# Patient Record
Sex: MALE | Race: WHITE | NOT HISPANIC OR LATINO | Employment: OTHER | URBAN - METROPOLITAN AREA
[De-identification: names, ages, dates, MRNs, and addresses within clinical notes are randomized per-mention and may not be internally consistent; named-entity substitution may affect disease eponyms.]

---

## 2017-08-15 ENCOUNTER — HOSPITAL ENCOUNTER (OUTPATIENT)
Facility: HOSPITAL | Age: 72
Setting detail: HOSPITAL OUTPATIENT SURGERY
End: 2017-08-15
Attending: SURGERY | Admitting: SURGERY

## 2017-09-13 ENCOUNTER — TRANSCRIBE ORDERS (OUTPATIENT)
Dept: ADMINISTRATIVE | Facility: HOSPITAL | Age: 72
End: 2017-09-13

## 2017-09-13 ENCOUNTER — HOSPITAL ENCOUNTER (OUTPATIENT)
Dept: GENERAL RADIOLOGY | Facility: HOSPITAL | Age: 72
Discharge: HOME | End: 2017-09-13
Admitting: SURGERY

## 2017-09-13 ENCOUNTER — LAB (OUTPATIENT)
Dept: LAB | Facility: HOSPITAL | Age: 72
End: 2017-09-13
Attending: INTERNAL MEDICINE

## 2017-09-13 ENCOUNTER — APPOINTMENT (OUTPATIENT)
Dept: PREADMISSION TESTING | Facility: HOSPITAL | Age: 72
End: 2017-09-13

## 2017-09-13 VITALS
OXYGEN SATURATION: 97 % | SYSTOLIC BLOOD PRESSURE: 158 MMHG | WEIGHT: 216 LBS | BODY MASS INDEX: 29.26 KG/M2 | RESPIRATION RATE: 16 BRPM | TEMPERATURE: 98.3 F | HEART RATE: 59 BPM | HEIGHT: 72 IN | DIASTOLIC BLOOD PRESSURE: 92 MMHG

## 2017-09-13 DIAGNOSIS — N17.9 ACUTE KIDNEY FAILURE, UNSPECIFIED (HCC): ICD-10-CM

## 2017-09-13 DIAGNOSIS — N17.9 ACUTE KIDNEY INJURY (NONTRAUMATIC) (HCC): Primary | ICD-10-CM

## 2017-09-13 DIAGNOSIS — N17.9 ACUTE KIDNEY FAILURE, UNSPECIFIED (HCC): Primary | ICD-10-CM

## 2017-09-13 LAB
ALBUMIN SERPL-MCNC: 3.7 G/DL (ref 3.5–5.2)
ALBUMIN/GLOB SERPL: 1.2 G/DL
ALP SERPL-CCNC: 81 U/L (ref 39–117)
ALT SERPL W P-5'-P-CCNC: 10 U/L (ref 1–41)
ANION GAP SERPL CALCULATED.3IONS-SCNC: 12.7 MMOL/L
APTT PPP: 31.1 SECONDS (ref 22.7–35.4)
AST SERPL-CCNC: 12 U/L (ref 1–40)
BACTERIA UR QL AUTO: ABNORMAL /HPF
BASOPHILS # BLD AUTO: 0.03 10*3/MM3 (ref 0–0.2)
BASOPHILS NFR BLD AUTO: 0.3 % (ref 0–1.5)
BILIRUB SERPL-MCNC: 0.3 MG/DL (ref 0.1–1.2)
BILIRUB UR QL STRIP: NEGATIVE
BUN BLD-MCNC: 22 MG/DL (ref 8–23)
BUN/CREAT SERPL: 8.9 (ref 7–25)
CALCIUM SPEC-SCNC: 9.5 MG/DL (ref 8.6–10.5)
CHLORIDE SERPL-SCNC: 104 MMOL/L (ref 98–107)
CLARITY UR: CLEAR
CO2 SERPL-SCNC: 23.3 MMOL/L (ref 22–29)
COARSE GRAN CASTS URNS QL MICRO: ABNORMAL /LPF
COLOR UR: YELLOW
CREAT BLD-MCNC: 2.48 MG/DL (ref 0.76–1.27)
DEPRECATED RDW RBC AUTO: 42.7 FL (ref 37–54)
EOSINOPHIL # BLD AUTO: 0.26 10*3/MM3 (ref 0–0.7)
EOSINOPHIL NFR BLD AUTO: 2.8 % (ref 0.3–6.2)
ERYTHROCYTE [DISTWIDTH] IN BLOOD BY AUTOMATED COUNT: 13.3 % (ref 11.5–14.5)
GFR SERPL CREATININE-BSD FRML MDRD: 26 ML/MIN/1.73
GLOBULIN UR ELPH-MCNC: 3 GM/DL
GLUCOSE BLD-MCNC: 70 MG/DL (ref 65–99)
GLUCOSE UR STRIP-MCNC: NEGATIVE MG/DL
HCT VFR BLD AUTO: 35.6 % (ref 40.4–52.2)
HGB BLD-MCNC: 12.1 G/DL (ref 13.7–17.6)
HGB UR QL STRIP.AUTO: ABNORMAL
HYALINE CASTS UR QL AUTO: ABNORMAL /LPF
IMM GRANULOCYTES # BLD: 0.02 10*3/MM3 (ref 0–0.03)
IMM GRANULOCYTES NFR BLD: 0.2 % (ref 0–0.5)
INR PPP: 0.95 (ref 0.9–1.1)
KETONES UR QL STRIP: NEGATIVE
LEUKOCYTE ESTERASE UR QL STRIP.AUTO: NEGATIVE
LYMPHOCYTES # BLD AUTO: 1.6 10*3/MM3 (ref 0.9–4.8)
LYMPHOCYTES NFR BLD AUTO: 16.9 % (ref 19.6–45.3)
MCH RBC QN AUTO: 29.4 PG (ref 27–32.7)
MCHC RBC AUTO-ENTMCNC: 34 G/DL (ref 32.6–36.4)
MCV RBC AUTO: 86.6 FL (ref 79.8–96.2)
MONOCYTES # BLD AUTO: 0.82 10*3/MM3 (ref 0.2–1.2)
MONOCYTES NFR BLD AUTO: 8.7 % (ref 5–12)
MUCOUS THREADS URNS QL MICRO: ABNORMAL /HPF
NEUTROPHILS # BLD AUTO: 6.72 10*3/MM3 (ref 1.9–8.1)
NEUTROPHILS NFR BLD AUTO: 71.1 % (ref 42.7–76)
NITRITE UR QL STRIP: NEGATIVE
PH UR STRIP.AUTO: 6 [PH] (ref 5–8)
PHOSPHATE SERPL-MCNC: 3.6 MG/DL (ref 2.5–4.5)
PLATELET # BLD AUTO: 407 10*3/MM3 (ref 140–500)
PMV BLD AUTO: 9.1 FL (ref 6–12)
POTASSIUM BLD-SCNC: 3.1 MMOL/L (ref 3.5–5.2)
PROT SERPL-MCNC: 6.7 G/DL (ref 6–8.5)
PROT UR QL STRIP: ABNORMAL
PROTHROMBIN TIME: 12.3 SECONDS (ref 11.7–14.2)
PTH-INTACT SERPL-MCNC: 66.1 PG/ML (ref 15–65)
RBC # BLD AUTO: 4.11 10*6/MM3 (ref 4.6–6)
RBC # UR: ABNORMAL /HPF
REF LAB TEST METHOD: ABNORMAL
SODIUM BLD-SCNC: 140 MMOL/L (ref 136–145)
SP GR UR STRIP: >=1.03 (ref 1–1.03)
SQUAMOUS #/AREA URNS HPF: ABNORMAL /HPF
URATE SERPL-MCNC: 7.4 MG/DL (ref 3.4–7)
UROBILINOGEN UR QL STRIP: ABNORMAL
WBC NRBC COR # BLD: 9.45 10*3/MM3 (ref 4.5–10.7)
WBC UR QL AUTO: ABNORMAL /HPF

## 2017-09-13 PROCEDURE — 81001 URINALYSIS AUTO W/SCOPE: CPT | Performed by: SURGERY

## 2017-09-13 PROCEDURE — 71020 HC CHEST PA AND LATERAL: CPT

## 2017-09-13 PROCEDURE — 85025 COMPLETE CBC W/AUTO DIFF WBC: CPT

## 2017-09-13 PROCEDURE — 93010 ELECTROCARDIOGRAM REPORT: CPT | Performed by: INTERNAL MEDICINE

## 2017-09-13 PROCEDURE — 84550 ASSAY OF BLOOD/URIC ACID: CPT

## 2017-09-13 PROCEDURE — 85610 PROTHROMBIN TIME: CPT | Performed by: SURGERY

## 2017-09-13 PROCEDURE — 36415 COLL VENOUS BLD VENIPUNCTURE: CPT

## 2017-09-13 PROCEDURE — 80053 COMPREHEN METABOLIC PANEL: CPT | Performed by: SURGERY

## 2017-09-13 PROCEDURE — 85730 THROMBOPLASTIN TIME PARTIAL: CPT | Performed by: SURGERY

## 2017-09-13 PROCEDURE — 83970 ASSAY OF PARATHORMONE: CPT

## 2017-09-13 PROCEDURE — 93005 ELECTROCARDIOGRAM TRACING: CPT

## 2017-09-13 PROCEDURE — 84100 ASSAY OF PHOSPHORUS: CPT

## 2017-09-13 RX ORDER — HYDROCODONE BITARTRATE AND ACETAMINOPHEN 5; 325 MG/1; MG/1
1 TABLET ORAL EVERY 6 HOURS PRN
Status: ON HOLD | COMMUNITY
End: 2017-09-19 | Stop reason: SDUPTHER

## 2017-09-13 RX ORDER — AMLODIPINE BESYLATE 5 MG/1
5 TABLET ORAL EVERY MORNING
COMMUNITY

## 2017-09-13 RX ORDER — ERGOCALCIFEROL 1.25 MG/1
50000 CAPSULE ORAL 2 TIMES WEEKLY
COMMUNITY

## 2017-09-13 RX ORDER — FLUOXETINE HYDROCHLORIDE 40 MG/1
40 CAPSULE ORAL EVERY MORNING
COMMUNITY

## 2017-09-13 RX ORDER — ASPIRIN 81 MG/1
81 TABLET ORAL DAILY
COMMUNITY

## 2017-09-13 RX ORDER — METOPROLOL TARTRATE 50 MG/1
50 TABLET, FILM COATED ORAL 2 TIMES DAILY
COMMUNITY

## 2017-09-13 RX ORDER — CHLORAL HYDRATE 500 MG
1000 CAPSULE ORAL 2 TIMES DAILY WITH MEALS
COMMUNITY

## 2017-09-13 RX ORDER — OMEPRAZOLE 40 MG/1
40 CAPSULE, DELAYED RELEASE ORAL EVERY MORNING
COMMUNITY

## 2017-09-13 NOTE — DISCHARGE INSTRUCTIONS
Take the following medications the morning of surgery with a small sip of water:    METOPROLOL, FLUOXETINE, OMEPRAZOLE AND AMLODIPINE.    ARRIVE AT 8:30    General Instructions:  • Do not eat solid food after midnight the night before surgery.  • You may drink clear liquids day of surgery but must stop at least one hour before your hospital arrival time.  • It is beneficial for you to have a clear drink that contains carbohydrates the day of surgery.  We suggest a 20 ounce bottle of Gatorade or Powerade for non-diabetic patients or a 20 ounce bottle of G2 or Powerade Zero for diabetic patients. (Pediatric patients, are not advised to drink a 20 ounce carbohydrate drink)    Clear liquids are liquids you can see through.  Nothing red in color.     Plain water                               Sports drinks  Sodas                                   Gelatin (Jell-O)  Fruit juices without pulp such as white grape juice and apple juice  Popsicles that contain no fruit or yogurt  Tea or coffee (no cream or milk added)  Gatorade / Powerade  G2 / Powerade Zero    • Infants may have breast milk up to four hours before surgery.  • Infants drinking formula may drink formula up to six hours before surgery.   • Patients who avoid smoking, chewing tobacco and alcohol for 4 weeks prior to surgery have a reduced risk of post-operative complications.  Quit smoking as many days before surgery as you can.  • Do not smoke, use chewing tobacco or drink alcohol the day of surgery.   • If applicable bring your C-PAP/ BI-PAP machine.  • Bring any papers given to you in the doctor’s office.  • Wear clean comfortable clothes and socks.  • Do not wear contact lenses or make-up.  Bring a case for your glasses.   • Bring crutches or walker if applicable.  • Leave all other valuables and jewelry at home.  • The Pre-Admission Testing nurse will instruct you to bring medications if unable to obtain an accurate list in Pre-Admission Testing.        If  you were given a blood bank ID arm band remember to bring it with you the day of surgery.    Preventing a Surgical Site Infection:  • For 2 to 3 days before surgery, avoid shaving with a razor because the razor can irritate skin and make it easier to develop an infection.  • The night prior to surgery sleep in a clean bed with clean clothing.  Do not allow pets to sleep with you.  • Shower on the morning of surgery using a fresh bar of anti-bacterial soap (such as Dial) and clean washcloth.  Dry with a clean towel and dress in clean clothing.  • Ask your surgeon if you will be receiving antibiotics prior to surgery.  • Make sure you, your family, and all healthcare providers clean their hands with soap and water or an alcohol based hand  before caring for you or your wound.    Day of surgery:  Upon arrival, a Pre-op nurse and Anesthesiologist will review your health history, obtain vital signs, and answer questions you may have.  The only belongings needed at this time will be your home medications and if applicable your C-PAP/BI-PAP machine.  If you are staying overnight your family can leave the rest of your belongings in the car and bring them to your room later.  A Pre-op nurse will start an IV and you may receive medication in preparation for surgery, including something to help you relax.  Your family will be able to see you in the Pre-op area.  While you are in surgery your family should notify the waiting room  if they leave the waiting room area and provide a contact phone number.    Please be aware that surgery does come with discomfort.  We want to make every effort to control your discomfort so please discuss any uncontrolled symptoms with your nurse.   Your doctor will most likely have prescribed pain medications.      If you are going home after surgery you will receive individualized written care instructions before being discharged.  A responsible adult must drive you to and from  the hospital on the day of your surgery and stay with you for 24 hours.    If you are staying overnight following surgery, you will be transported to your hospital room following the recovery period.  [facility] has all private rooms.    If you have any questions please call Pre-Admission Testing at [phone].  Deductibles and co-payments are collected on the day of service. Please be prepared to pay the required co-pay, deductible or deposit on the day of service as defined by your plan.

## 2017-09-19 ENCOUNTER — HOSPITAL ENCOUNTER (OUTPATIENT)
Facility: HOSPITAL | Age: 72
Setting detail: OBSERVATION
LOS: 1 days | Discharge: HOME | End: 2017-09-20
Attending: INTERNAL MEDICINE | Admitting: INTERNAL MEDICINE

## 2017-09-19 ENCOUNTER — APPOINTMENT (OUTPATIENT)
Dept: CT IMAGING | Facility: HOSPITAL | Age: 72
End: 2017-09-19

## 2017-09-19 DIAGNOSIS — R53.1 WEAKNESS: Primary | ICD-10-CM

## 2017-09-19 PROBLEM — I10 ESSENTIAL HYPERTENSION: Status: ACTIVE | Noted: 2017-09-19

## 2017-09-19 PROBLEM — E11.8 TYPE 2 DIABETES MELLITUS WITH COMPLICATION (HCC): Status: ACTIVE | Noted: 2017-09-19

## 2017-09-19 PROBLEM — N17.9 ACUTE RENAL FAILURE (HCC): Status: ACTIVE | Noted: 2017-09-19

## 2017-09-19 PROBLEM — I48.91 ATRIAL FIBRILLATION (HCC): Status: ACTIVE | Noted: 2017-09-19

## 2017-09-19 PROBLEM — N18.4 CKD (CHRONIC KIDNEY DISEASE) STAGE 4, GFR 15-29 ML/MIN (HCC): Status: ACTIVE | Noted: 2017-09-19

## 2017-09-19 PROBLEM — R26.9 NEUROLOGIC GAIT DISORDER: Status: ACTIVE | Noted: 2017-09-19

## 2017-09-19 PROBLEM — R11.2 NAUSEA & VOMITING: Status: ACTIVE | Noted: 2017-09-19

## 2017-09-19 PROBLEM — N17.9 ACUTE RENAL FAILURE (HCC): Status: RESOLVED | Noted: 2017-09-19 | Resolved: 2017-09-19

## 2017-09-19 LAB
ALBUMIN SERPL-MCNC: 3.6 G/DL (ref 3.5–5.2)
ALBUMIN/GLOB SERPL: 1.1 G/DL
ALP SERPL-CCNC: 86 U/L (ref 39–117)
ALT SERPL W P-5'-P-CCNC: 8 U/L (ref 1–41)
ANION GAP SERPL CALCULATED.3IONS-SCNC: 16.6 MMOL/L
AST SERPL-CCNC: 11 U/L (ref 1–40)
BASOPHILS # BLD AUTO: 0.02 10*3/MM3 (ref 0–0.2)
BASOPHILS NFR BLD AUTO: 0.2 % (ref 0–1.5)
BILIRUB SERPL-MCNC: 0.4 MG/DL (ref 0.1–1.2)
BUN BLD-MCNC: 21 MG/DL (ref 8–23)
BUN/CREAT SERPL: 7.9 (ref 7–25)
CALCIUM SPEC-SCNC: 9.9 MG/DL (ref 8.6–10.5)
CHLORIDE SERPL-SCNC: 102 MMOL/L (ref 98–107)
CO2 SERPL-SCNC: 22.4 MMOL/L (ref 22–29)
CREAT BLD-MCNC: 2.65 MG/DL (ref 0.76–1.27)
DEPRECATED RDW RBC AUTO: 42.6 FL (ref 37–54)
EOSINOPHIL # BLD AUTO: 0.13 10*3/MM3 (ref 0–0.7)
EOSINOPHIL NFR BLD AUTO: 1.4 % (ref 0.3–6.2)
ERYTHROCYTE [DISTWIDTH] IN BLOOD BY AUTOMATED COUNT: 13.3 % (ref 11.5–14.5)
GFR SERPL CREATININE-BSD FRML MDRD: 24 ML/MIN/1.73
GLOBULIN UR ELPH-MCNC: 3.3 GM/DL
GLUCOSE BLD-MCNC: 118 MG/DL (ref 65–99)
GLUCOSE BLDC GLUCOMTR-MCNC: 111 MG/DL (ref 70–130)
GLUCOSE BLDC GLUCOMTR-MCNC: 133 MG/DL (ref 70–130)
HBA1C MFR BLD: 6.29 % (ref 4.8–5.6)
HCT VFR BLD AUTO: 35.1 % (ref 40.4–52.2)
HGB BLD-MCNC: 11.8 G/DL (ref 13.7–17.6)
IMM GRANULOCYTES # BLD: 0.03 10*3/MM3 (ref 0–0.03)
IMM GRANULOCYTES NFR BLD: 0.3 % (ref 0–0.5)
INR PPP: 1.01 (ref 0.9–1.1)
LYMPHOCYTES # BLD AUTO: 1.1 10*3/MM3 (ref 0.9–4.8)
LYMPHOCYTES NFR BLD AUTO: 12.2 % (ref 19.6–45.3)
MAGNESIUM SERPL-MCNC: 1.8 MG/DL (ref 1.6–2.4)
MCH RBC QN AUTO: 29.4 PG (ref 27–32.7)
MCHC RBC AUTO-ENTMCNC: 33.6 G/DL (ref 32.6–36.4)
MCV RBC AUTO: 87.3 FL (ref 79.8–96.2)
MONOCYTES # BLD AUTO: 0.67 10*3/MM3 (ref 0.2–1.2)
MONOCYTES NFR BLD AUTO: 7.4 % (ref 5–12)
NEUTROPHILS # BLD AUTO: 7.1 10*3/MM3 (ref 1.9–8.1)
NEUTROPHILS NFR BLD AUTO: 78.5 % (ref 42.7–76)
PHOSPHATE SERPL-MCNC: 3.8 MG/DL (ref 2.5–4.5)
PLATELET # BLD AUTO: 370 10*3/MM3 (ref 140–500)
PMV BLD AUTO: 9.2 FL (ref 6–12)
POTASSIUM BLD-SCNC: 3.8 MMOL/L (ref 3.5–5.2)
PROT SERPL-MCNC: 6.9 G/DL (ref 6–8.5)
PROTHROMBIN TIME: 12.9 SECONDS (ref 11.7–14.2)
RBC # BLD AUTO: 4.02 10*6/MM3 (ref 4.6–6)
SODIUM BLD-SCNC: 141 MMOL/L (ref 136–145)
TSH SERPL DL<=0.05 MIU/L-ACNC: 0.86 MIU/ML (ref 0.27–4.2)
WBC NRBC COR # BLD: 9.05 10*3/MM3 (ref 4.5–10.7)

## 2017-09-19 PROCEDURE — 82962 GLUCOSE BLOOD TEST: CPT

## 2017-09-19 PROCEDURE — G0378 HOSPITAL OBSERVATION PER HR: HCPCS

## 2017-09-19 PROCEDURE — 93005 ELECTROCARDIOGRAM TRACING: CPT | Performed by: INTERNAL MEDICINE

## 2017-09-19 PROCEDURE — 74176 CT ABD & PELVIS W/O CONTRAST: CPT

## 2017-09-19 PROCEDURE — 85610 PROTHROMBIN TIME: CPT | Performed by: INTERNAL MEDICINE

## 2017-09-19 PROCEDURE — 96361 HYDRATE IV INFUSION ADD-ON: CPT

## 2017-09-19 PROCEDURE — 85025 COMPLETE CBC W/AUTO DIFF WBC: CPT | Performed by: INTERNAL MEDICINE

## 2017-09-19 PROCEDURE — 93010 ELECTROCARDIOGRAM REPORT: CPT | Performed by: INTERNAL MEDICINE

## 2017-09-19 PROCEDURE — 80053 COMPREHEN METABOLIC PANEL: CPT | Performed by: INTERNAL MEDICINE

## 2017-09-19 PROCEDURE — 84100 ASSAY OF PHOSPHORUS: CPT | Performed by: INTERNAL MEDICINE

## 2017-09-19 PROCEDURE — 83735 ASSAY OF MAGNESIUM: CPT | Performed by: INTERNAL MEDICINE

## 2017-09-19 PROCEDURE — 83036 HEMOGLOBIN GLYCOSYLATED A1C: CPT | Performed by: INTERNAL MEDICINE

## 2017-09-19 PROCEDURE — 84443 ASSAY THYROID STIM HORMONE: CPT | Performed by: INTERNAL MEDICINE

## 2017-09-19 RX ORDER — ACETAMINOPHEN 325 MG/1
650 TABLET ORAL EVERY 4 HOURS PRN
Status: DISCONTINUED | OUTPATIENT
Start: 2017-09-19 | End: 2017-09-20 | Stop reason: HOSPADM

## 2017-09-19 RX ORDER — NICOTINE POLACRILEX 4 MG
15 LOZENGE BUCCAL
Status: DISCONTINUED | OUTPATIENT
Start: 2017-09-19 | End: 2017-09-20 | Stop reason: HOSPADM

## 2017-09-19 RX ORDER — HYDROCODONE BITARTRATE AND ACETAMINOPHEN 5; 325 MG/1; MG/1
1 TABLET ORAL EVERY 4 HOURS PRN
Status: DISCONTINUED | OUTPATIENT
Start: 2017-09-19 | End: 2017-09-20 | Stop reason: HOSPADM

## 2017-09-19 RX ORDER — SODIUM CHLORIDE 9 MG/ML
75 INJECTION, SOLUTION INTRAVENOUS CONTINUOUS
Status: DISCONTINUED | OUTPATIENT
Start: 2017-09-19 | End: 2017-09-20

## 2017-09-19 RX ORDER — ONDANSETRON 4 MG/1
4 TABLET, ORALLY DISINTEGRATING ORAL EVERY 6 HOURS PRN
Status: DISCONTINUED | OUTPATIENT
Start: 2017-09-19 | End: 2017-09-20 | Stop reason: HOSPADM

## 2017-09-19 RX ORDER — SODIUM CHLORIDE 0.9 % (FLUSH) 0.9 %
1-10 SYRINGE (ML) INJECTION AS NEEDED
Status: DISCONTINUED | OUTPATIENT
Start: 2017-09-19 | End: 2017-09-20 | Stop reason: HOSPADM

## 2017-09-19 RX ORDER — DEXTROSE MONOHYDRATE 25 G/50ML
25 INJECTION, SOLUTION INTRAVENOUS
Status: DISCONTINUED | OUTPATIENT
Start: 2017-09-19 | End: 2017-09-20 | Stop reason: HOSPADM

## 2017-09-19 RX ORDER — METOPROLOL TARTRATE 50 MG/1
50 TABLET, FILM COATED ORAL 2 TIMES DAILY
Status: DISCONTINUED | OUTPATIENT
Start: 2017-09-19 | End: 2017-09-20 | Stop reason: HOSPADM

## 2017-09-19 RX ORDER — INSULIN GLARGINE 100 [IU]/ML
50 INJECTION, SOLUTION SUBCUTANEOUS NIGHTLY
COMMUNITY

## 2017-09-19 RX ORDER — NALOXONE HCL 0.4 MG/ML
0.4 VIAL (ML) INJECTION
Status: DISCONTINUED | OUTPATIENT
Start: 2017-09-19 | End: 2017-09-20 | Stop reason: HOSPADM

## 2017-09-19 RX ORDER — HYDROMORPHONE HYDROCHLORIDE 1 MG/ML
0.5 INJECTION, SOLUTION INTRAMUSCULAR; INTRAVENOUS; SUBCUTANEOUS
Status: DISCONTINUED | OUTPATIENT
Start: 2017-09-19 | End: 2017-09-20 | Stop reason: HOSPADM

## 2017-09-19 RX ORDER — ONDANSETRON 2 MG/ML
4 INJECTION INTRAMUSCULAR; INTRAVENOUS EVERY 6 HOURS PRN
Status: DISCONTINUED | OUTPATIENT
Start: 2017-09-19 | End: 2017-09-20 | Stop reason: HOSPADM

## 2017-09-19 RX ORDER — HYDROCODONE BITARTRATE AND ACETAMINOPHEN 10; 325 MG/1; MG/1
1 TABLET ORAL 4 TIMES DAILY
COMMUNITY

## 2017-09-19 RX ORDER — FLUOXETINE HYDROCHLORIDE 20 MG/1
40 CAPSULE ORAL EVERY MORNING
Status: DISCONTINUED | OUTPATIENT
Start: 2017-09-20 | End: 2017-09-20 | Stop reason: HOSPADM

## 2017-09-19 RX ORDER — ONDANSETRON 4 MG/1
4 TABLET, FILM COATED ORAL EVERY 6 HOURS PRN
Status: DISCONTINUED | OUTPATIENT
Start: 2017-09-19 | End: 2017-09-20 | Stop reason: HOSPADM

## 2017-09-19 RX ORDER — PANTOPRAZOLE SODIUM 40 MG/1
40 TABLET, DELAYED RELEASE ORAL EVERY MORNING
Status: DISCONTINUED | OUTPATIENT
Start: 2017-09-20 | End: 2017-09-20 | Stop reason: HOSPADM

## 2017-09-19 RX ORDER — AMLODIPINE BESYLATE 5 MG/1
5 TABLET ORAL EVERY MORNING
Status: DISCONTINUED | OUTPATIENT
Start: 2017-09-20 | End: 2017-09-19

## 2017-09-19 RX ORDER — CEFAZOLIN SODIUM 2 G/100ML
2 INJECTION, SOLUTION INTRAVENOUS ONCE
Status: CANCELLED | OUTPATIENT
Start: 2017-09-20 | End: 2017-09-19

## 2017-09-19 RX ORDER — NITROGLYCERIN 0.4 MG/1
0.4 TABLET SUBLINGUAL
Status: DISCONTINUED | OUTPATIENT
Start: 2017-09-19 | End: 2017-09-20 | Stop reason: HOSPADM

## 2017-09-19 RX ORDER — AMLODIPINE BESYLATE 5 MG/1
5 TABLET ORAL EVERY MORNING
Status: DISCONTINUED | OUTPATIENT
Start: 2017-09-19 | End: 2017-09-20 | Stop reason: HOSPADM

## 2017-09-19 RX ORDER — CEFAZOLIN SODIUM 2 G/100ML
2 INJECTION, SOLUTION INTRAVENOUS ONCE
Status: CANCELLED | OUTPATIENT
Start: 2017-09-20 | End: 2017-09-20

## 2017-09-19 RX ORDER — SENNA AND DOCUSATE SODIUM 50; 8.6 MG/1; MG/1
2 TABLET, FILM COATED ORAL NIGHTLY PRN
Status: DISCONTINUED | OUTPATIENT
Start: 2017-09-19 | End: 2017-09-20 | Stop reason: HOSPADM

## 2017-09-19 RX ADMIN — AMLODIPINE BESYLATE 5 MG: 5 TABLET ORAL at 23:23

## 2017-09-19 RX ADMIN — METOPROLOL TARTRATE 50 MG: 50 TABLET, FILM COATED ORAL at 18:25

## 2017-09-19 RX ADMIN — HYDROCODONE BITARTRATE AND ACETAMINOPHEN 1 TABLET: 5; 325 TABLET ORAL at 20:12

## 2017-09-19 RX ADMIN — SODIUM CHLORIDE 75 ML/HR: 9 INJECTION, SOLUTION INTRAVENOUS at 18:25

## 2017-09-19 NOTE — PROGRESS NOTES
Clinical Pharmacy Services: Medication History    Robert Botello is a 71 y.o. male presenting to [facility] with chief complaint of:   Acute renal failure     He  has a past medical history of Acid reflux; Anemia; Atrial fibrillation; Coronary artery disease; Diabetes mellitus; Exposure to TB; Heart attack; History of transfusion; Hypertension; Kidney disease; Leg weakness; and Neuropathy.    Allergies as of 09/19/2017 - Ke as Reviewed 09/13/2017   Allergen Reaction Noted   • Atorvastatin  09/13/2017       Medication information was obtained from: Patient and patient's pharmacy  Pharmacy and Phone Number: [facility], [phone]    Prior to Admission Medications     Prescriptions Last Dose Informant Patient Reported? Taking?    amLODIPine (NORVASC) 5 MG tablet  Pharmacy Yes Yes    Take 5 mg by mouth Every Morning.    FLUoxetine (PROzac) 40 MG capsule  Pharmacy Yes Yes    Take 40 mg by mouth Every Morning.    HYDROcodone-acetaminophen (NORCO)  MG per tablet  Pharmacy Yes Yes    Take 1 tablet by mouth 4 (Four) Times a Day.    insulin glargine (LANTUS) 100 UNIT/ML injection  Pharmacy Yes Yes    Inject 50 Units under the skin Every Night.    metoprolol tartrate (LOPRESSOR) 50 MG tablet  Pharmacy Yes Yes    Take 50 mg by mouth 2 (Two) Times a Day.    Omega-3 Fatty Acids (FISH OIL) 1000 MG capsule capsule  Pharmacy Yes Yes    Take 1,000 mg by mouth 2 (Two) Times a Day With Meals.    omeprazole (priLOSEC) 40 MG capsule  Pharmacy Yes Yes    Take 40 mg by mouth Every Morning.    aspirin 81 MG EC tablet  Self Yes No    Take 81 mg by mouth Daily. PT HOLDING FOR SURGERY    vitamin D (ERGOCALCIFEROL) 22311 units capsule capsule  Pharmacy Yes No    Take 50,000 Units by mouth 2 (Two) Times a Week.            Medication notes: The following changes were made to the patient's home med list, per pharmacy:  -Norco 10/325 mg QID  -Lantus 50 units SQ QHS    Pt's pharmacy noted pt has Rx for Vitamin D 50,000 units 2 times a week  but has not filled since 12/16    This medication list is complete to the best of my knowledge as of 9/19/2017    Please call if questions.    Alfreda Navarro, Pharmacy Intern  9/19/2017 3:32 PM

## 2017-09-19 NOTE — H&P
Name: Robert Botello ADMIT: 2017   : 1945  PCP: No Known Provider    MRN: [id number] LOS: 1 days   AGE/SEX: 71 y.o. male  ROOM: 520/1     No chief complaint on file.  nausea/vomiting    Subjective   Mr. Botello is a 71 y.o. male who presents to [facility] from nephrology clinic for nausea and vomiting and left leg weakness. He was admitted for IV hydration and control of symptoms before planned AV fistula placement tomorrow. He reports several months of nausea and dry heaves which have been slowly worsening. He denies abdominal pain. Last BM was today and was normal. No CP or SOA. He also reports about 50lbs of unanticipated weight loss. He denies night sweats or lymphadenopathy. He has also had several months of left leg 'giving out'. This has been worked up by multiple MD's at [place] including Neurology and he recently had lumbar MRI showing chronic changes but no spinal cord impingement. He denies any leg pain to me currently and his leg will just give out randomly without preceding symptoms. He denies any palpitations or LOC associated. He has not had foot drop or peripheral numbness.    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   • Heart attack        • History of transfusion    • Hypertension    • Kidney disease    • Leg weakness     USES WALKER   • Neuropathy      Past Surgical History:   Procedure Laterality Date   • ANKLE SURGERY     • AORTIC VALVE REPAIR/REPLACEMENT     • CORONARY ANGIOPLASTY     • CORONARY STENT PLACEMENT     • HEMORROIDECTOMY     • TIBIA FRACTURE SURGERY       Family History   Problem Relation Age of Onset   • Malig Hyperthermia Neg Hx      Social History   Substance Use Topics   • Smoking status: Former Smoker     Packs/day: 0.50     Years: 34.00     Types: Cigarettes   • Smokeless  tobacco: Never Used   • Alcohol use No     Prescriptions Prior to Admission   Medication Sig Dispense Refill Last Dose   • amLODIPine (NORVASC) 5 MG tablet Take 5 mg by mouth Every Morning.    at Unknown time   • FLUoxetine (PROzac) 40 MG capsule Take 40 mg by mouth Every Morning.      • HYDROcodone-acetaminophen (NORCO)  MG per tablet Take 1 tablet by mouth 4 (Four) Times a Day.   9/19/2017 at 1000   • insulin glargine (LANTUS) 100 UNIT/ML injection Inject 50 Units under the skin Every Night.      • metoprolol tartrate (LOPRESSOR) 50 MG tablet Take 50 mg by mouth 2 (Two) Times a Day.      • Omega-3 Fatty Acids (FISH OIL) 1000 MG capsule capsule Take 1,000 mg by mouth 2 (Two) Times a Day With Meals.   8/30/2017   • omeprazole (priLOSEC) 40 MG capsule Take 40 mg by mouth Every Morning.      • aspirin 81 MG EC tablet Take 81 mg by mouth Daily. PT HOLDING FOR SURGERY   8/30/2017   • vitamin D (ERGOCALCIFEROL) 76668 units capsule capsule Take 50,000 Units by mouth 2 (Two) Times a Week.        Allergies:  Atorvastatin    Review of Systems   Constitutional: Positive for unexpected weight change. Negative for chills and fever.   HENT: Negative for sore throat and trouble swallowing.    Eyes: Positive for pain. Negative for visual disturbance.   Respiratory: Negative for cough, shortness of breath and wheezing.    Cardiovascular: Negative for chest pain, palpitations and leg swelling.   Gastrointestinal: Positive for nausea and vomiting. Negative for constipation and diarrhea.   Endocrine: Negative for cold intolerance and heat intolerance.   Genitourinary: Negative for difficulty urinating and dysuria.   Musculoskeletal: Positive for gait problem. Negative for back pain, neck pain and neck stiffness.   Skin: Negative for pallor and rash.   Allergic/Immunologic: Negative for environmental allergies and food allergies.   Neurological: Negative for seizures and syncope.   Hematological: Negative for adenopathy.    Psychiatric/Behavioral: Negative for agitation and confusion.        Objective    Vital Signs  Temp:  [98.3 °F (36.8 °C)] 98.3 °F (36.8 °C)  Heart Rate:  [64] 64  Resp:  [14] 14  BP: (132)/(86) 132/86  SpO2:  [99 %] 99 %  on   ;   O2 Device: room air  Body mass index is 28.21 kg/(m^2).    Physical Exam   Constitutional: He is oriented to person, place, and time. He appears well-developed and well-nourished. No distress.   HENT:   Head: Normocephalic and atraumatic.   Eyes: Conjunctivae and EOM are normal. Pupils are equal, round, and reactive to light.   Neck: Normal range of motion. Neck supple.   Cardiovascular: Normal rate, regular rhythm, normal heart sounds and intact distal pulses.    Pulmonary/Chest: Effort normal and breath sounds normal. He has no wheezes.   Abdominal: Soft. Bowel sounds are normal. He exhibits no distension and no mass. There is no tenderness. There is no rebound and no guarding.   Musculoskeletal: Normal range of motion. He exhibits no edema or tenderness.   4/5 left hip strength compared to 5/5 right hip.   Neurological: He is alert and oriented to person, place, and time. No cranial nerve deficit. He exhibits normal muscle tone.   Skin: Skin is warm and dry. He is not diaphoretic.   Psychiatric: He has a normal mood and affect. His behavior is normal.   Nursing note and vitals reviewed.      Results Review:   I reviewed the patient's new clinical results. Reviewed EKG, sinus rhythm, twave and st changes in lateral leads unchanged from previous EKG. Reviewed prior records.    Results from last 7 days  Lab Units 09/19/17  1600 09/13/17  1029   WBC 10*3/mm3 9.05 9.45   HEMOGLOBIN g/dL 11.8* 12.1*   PLATELETS 10*3/mm3 370 407     Results from last 7 days  Lab Units 09/19/17  1600 09/13/17  1029   SODIUM mmol/L 141 140   POTASSIUM mmol/L 3.8 3.1*   CHLORIDE mmol/L 102 104   CO2 mmol/L 22.4 23.3   BUN mg/dL 21 22   CREATININE mg/dL 2.65* 2.48*   GLUCOSE mg/dL 118* 70   ALBUMIN g/dL 3.60  3.70   BILIRUBIN mg/dL 0.4 0.3   ALK PHOS U/L 86 81   AST (SGOT) U/L 11 12   ALT (SGPT) U/L 8 10   Estimated Creatinine Clearance: 31.4 mL/min (by C-G formula based on Cr of 2.65).  Results from last 7 days  Lab Units 09/19/17  1600 09/13/17  1029   INR  1.01 0.95       Results from last 7 days  Lab Units 09/13/17  1029   NITRITE UA  Negative   WBC UA /HPF 0-2   BACTERIA UA /HPF 1+*   SQUAM EPITHEL UA /HPF None Seen       No orders to display     Assessment/Plan   Assessment:     Active Hospital Problems (** Indicates Principal Problem)    Diagnosis Date Noted   • **Nausea & vomiting [R11.2] 09/19/2017   • CKD (chronic kidney disease) stage 4, GFR 15-29 ml/min [N18.4] 09/19/2017   • Type 2 diabetes mellitus with complication [E11.8] 09/19/2017   • Essential hypertension [I10] 09/19/2017   • Neurologic gait disorder [R26.9] 09/19/2017      Resolved Hospital Problems    Diagnosis Date Noted Date Resolved   • Acute renal failure [N17.9] 09/19/2017 09/19/2017       Plan:     - N/V/Weight Loss: Overall Uremia symptoms seem most likely except for his degree of weight loss. His BUN is not grossly elevated and Cr is stable compared to previous values. Will check CT Abd/Pelvis to investigate. Gentle IVF, symptomatic control.  - CKD 4/5: Cr and lytes stable. Consult Nephrology  - Planned AV Fistula: EKG unchanged. No symptoms of heart failure. Can proceed with procedure without additional cardiac workup. Consult Vascular Surgery.  - Neurologic Gait Disorder: Workup done at [facility] including MRI. Left hip weakness but otherwise normal exam. PT consult.  - PPx SCD/ROXANNE  - Full Code    I discussed the patients findings and my recommendations with patient, family, nursing staff and consulting provider.    Rodo Doran MD  [facility]  09/19/17  7:17 PM

## 2017-09-19 NOTE — CONSULTS
Patient Name: Robert Botello Account #: [id number]    MRN: [id number] Admission Date: 9/19/2017      Consulting Service: Vascular Surgery Date of Evaluation: September 19, 2017    Requesting Provider: Dr. Rodo Doran MD    CHIEF COMPLAINT: Known advancing renal failure now with severe left leg pain and nausea and weight loss   HPI: Robert Botello is a 71 y.o. male is being seen for a consultation and evaluation/management of multiple somatic complaints including severe left leg pain, nausea and weight loss and progressive renal failure.  It is unclear to me whether he is progressed to acute renal failure and will need to have a tunnel dialysis catheter placement or whether the elective fistula placement he has on for tomorrow is still the plan.  The patient is completely unaware of the reason for his admission as is his wife.  At this point in time he states his admission is because of his leg pain.  His wife thinks is because he is not eating and has lost 50 pounds per his report.  There is no notation of the charts and I somehow cannot get a hold of any of his records of his prior workup.  He was admitted at the request of [facility]rology Associates.    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   • Heart attack     2012   • History of transfusion    • Hypertension    • Kidney disease    • Leg weakness     USES WALKER   • Neuropathy       PAST SURGICAL HISTORY:   Past Surgical History:   Procedure Laterality Date   • ANKLE SURGERY     • AORTIC VALVE REPAIR/REPLACEMENT     • CORONARY ANGIOPLASTY     • CORONARY STENT PLACEMENT     • HEMORROIDECTOMY     • TIBIA FRACTURE SURGERY        FAMILY HISTORY:   Family History   Problem Relation Age of Onset   • Malig Hyperthermia Neg Hx       SOCIAL HISTORY:   Social History   Substance Use Topics   • Smoking  "status: Former Smoker     Packs/day: 0.50     Years: 34.00     Types: Cigarettes   • Smokeless tobacco: Never Used   • Alcohol use No      MEDICATIONS:   No current facility-administered medications on file prior to encounter.      Current Outpatient Prescriptions on File Prior to Encounter   Medication Sig Dispense Refill   • amLODIPine (NORVASC) 5 MG tablet Take 5 mg by mouth Every Morning.     • FLUoxetine (PROzac) 40 MG capsule Take 40 mg by mouth Every Morning.     • metoprolol tartrate (LOPRESSOR) 50 MG tablet Take 50 mg by mouth 2 (Two) Times a Day.     • Omega-3 Fatty Acids (FISH OIL) 1000 MG capsule capsule Take 1,000 mg by mouth 2 (Two) Times a Day With Meals.     • omeprazole (priLOSEC) 40 MG capsule Take 40 mg by mouth Every Morning.     • aspirin 81 MG EC tablet Take 81 mg by mouth Daily. PT HOLDING FOR SURGERY     • vitamin D (ERGOCALCIFEROL) 11492 units capsule capsule Take 50,000 Units by mouth 2 (Two) Times a Week.     • [DISCONTINUED] HYDROcodone-acetaminophen (NORCO) 5-325 MG per tablet Take 1 tablet by mouth Every 6 (Six) Hours As Needed.     • [DISCONTINUED] Insulin Glargine (LANTUS SC) Inject  under the skin Every Night. 45-50 PER SLIDING SCALE               ALLERGIES: Atorvastatin   COMPLETE REVIEW OF SYSTEMS:     ENT ROS: negative  Cardiovascular ROS: no chest pain or dyspnea on exertion  Respiratory ROS: no cough, shortness of breath, or wheezing  Gastrointestinal ROS: no abdominal pain, change in bowel habits, or black or bloody stools  Neurological ROS: no TIA or stroke symptoms  Genito-Urinary ROS: no dysuria, trouble voiding, or hematuria  Musculoskeletal ROS: negative  Dermatological ROS: negative  Psychological ROS: negative      PHYSICAL EXAM:   Patient Vitals for the past 24 hrs:   BP Temp Temp src Pulse Resp SpO2 Height Weight   09/19/17 1521 132/86 98.3 °F (36.8 °C) Oral 64 14 99 % 72\" (182.9 cm) 208 lb (94.3 kg)        General appearance: alert, well appearing, and in no " distress.  Neurological exam reveals alert, oriented, normal speech, no focal findings or movement disorder noted.  ENT exam reveals - ENT exam normal, no neck nodes or sinus tenderness.  CVS exam: normal rate, regular rhythm, normal S1, S2, no murmurs, rubs, clicks or gallops.  Chest: clear to auscultation, no wheezes, rales or rhonchi, symmetric air entry.  Abdominal exam: soft, nontender, nondistended, no masses or organomegaly.  Examination of the feet reveals warm, good capillary refill.He has easily palpable pulse at all sites with a slight left carotid bruit.  He has tenderness to his left leg diffusely stocking glove with no obvious physical findings other than bilateral lower extremity +2 edema and his left popliteal pulses slightly widened.        LABS:      Results Review:       I reviewed the patient's new clinical results.    Results from last 7 days  Lab Units 09/19/17  1600 09/13/17  1029   WBC 10*3/mm3 9.05 9.45   HEMOGLOBIN g/dL 11.8* 12.1*   PLATELETS 10*3/mm3 370 407     Results from last 7 days  Lab Units 09/19/17  1600 09/13/17  1029   SODIUM mmol/L 141 140   POTASSIUM mmol/L 3.8 3.1*   CHLORIDE mmol/L 102 104   CO2 mmol/L 22.4 23.3   BUN mg/dL 21 22   CREATININE mg/dL 2.65* 2.48*   GLUCOSE mg/dL 118* 70   Estimated Creatinine Clearance: 31.4 mL/min (by C-G formula based on Cr of 2.65).  Results from last 7 days  Lab Units 09/19/17  1600 09/13/17  1029   CALCIUM mg/dL 9.9 9.5   ALBUMIN g/dL 3.60 3.70   MAGNESIUM mg/dL 1.8  --    PHOSPHORUS mg/dL 3.8 3.6       Results from last 7 days  Lab Units 09/19/17  1600 09/13/17  1029   PROTIME Seconds 12.9 12.3   INR  1.01 0.95       The following radiologic or non-invasive studies have been reviewed by me: Chest x-ray showing tortuous aorta    Active Hospital Problems (** Indicates Principal Problem)    Diagnosis Date Noted   • **Nausea & vomiting [R11.2] 09/19/2017   • CKD (chronic kidney disease) stage 4, GFR 15-29 ml/min [N18.4] 09/19/2017   • Type 2  diabetes mellitus with complication [E11.8] 09/19/2017   • Essential hypertension [I10] 09/19/2017   • Atrial fibrillation [I48.91] 09/19/2017      Resolved Hospital Problems    Diagnosis Date Noted Date Resolved   • Acute renal failure [N17.9] 09/19/2017 09/19/2017         ASSESSMENT/PLAN: 71 y.o. male with Known renal insufficiency.  He is on the schedule for surgery tomorrow to have an elective left brachiocephalic AV fistula placement.  At this point in time his admission remains as little bit of a mystery to me as I'm unclear as to what the actual reason for it is.  If his admission is for severe leg pain and workup is needed to determine the source then I still believe the placement of a fistula is warranted as he will likely need dialysis down the road.  If he is admitted for weight loss and nausea and vomiting and concern is hidden malignancy then it may make more sense to delay his procedure until that is ruled out.  Finally it is quite likely that he is being admitted because of progressive uremia with a poor understanding of his own symptoms.  This is the case he may need a tunneled hemodialysis catheter placed instead of or in addition to his left AV fistula placement tomorrow.  We'll need a call back from the renal doctors to determine what the plan is.  We will keep him nothing by mouth after midnight and preoperatively for his fistula placement.  I will add possible line placement to his consent.      I discussed the plan with the patient and he and his wife are  agreeable to the plan of care at this point. Thank you for this consult.     Bharat eMrcer MD   09/19/17

## 2017-09-20 ENCOUNTER — ANESTHESIA EVENT (OUTPATIENT)
Dept: PERIOP | Facility: HOSPITAL | Age: 72
End: 2017-09-20

## 2017-09-20 ENCOUNTER — ANESTHESIA (OUTPATIENT)
Dept: PERIOP | Facility: HOSPITAL | Age: 72
End: 2017-09-20

## 2017-09-20 VITALS
HEART RATE: 63 BPM | DIASTOLIC BLOOD PRESSURE: 91 MMHG | WEIGHT: 208 LBS | RESPIRATION RATE: 20 BRPM | BODY MASS INDEX: 28.17 KG/M2 | OXYGEN SATURATION: 96 % | TEMPERATURE: 98.4 F | SYSTOLIC BLOOD PRESSURE: 173 MMHG | HEIGHT: 72 IN

## 2017-09-20 LAB
ALBUMIN SERPL-MCNC: 3.1 G/DL (ref 3.5–5.2)
ANION GAP SERPL CALCULATED.3IONS-SCNC: 12.8 MMOL/L
BACTERIA UR QL AUTO: NORMAL /HPF
BASOPHILS # BLD AUTO: 0.02 10*3/MM3 (ref 0–0.2)
BASOPHILS NFR BLD AUTO: 0.3 % (ref 0–1.5)
BILIRUB UR QL STRIP: NEGATIVE
BUN BLD-MCNC: 21 MG/DL (ref 8–23)
BUN/CREAT SERPL: 9.5 (ref 7–25)
CALCIUM SPEC-SCNC: 9.2 MG/DL (ref 8.6–10.5)
CHLORIDE SERPL-SCNC: 102 MMOL/L (ref 98–107)
CLARITY UR: ABNORMAL
CO2 SERPL-SCNC: 23.2 MMOL/L (ref 22–29)
COLOR UR: YELLOW
CORTIS SERPL-MCNC: 10.46 MCG/DL
CREAT BLD-MCNC: 2.21 MG/DL (ref 0.76–1.27)
DEPRECATED RDW RBC AUTO: 42.6 FL (ref 37–54)
EOSINOPHIL # BLD AUTO: 0.26 10*3/MM3 (ref 0–0.7)
EOSINOPHIL NFR BLD AUTO: 3.3 % (ref 0.3–6.2)
ERYTHROCYTE [DISTWIDTH] IN BLOOD BY AUTOMATED COUNT: 13.2 % (ref 11.5–14.5)
GFR SERPL CREATININE-BSD FRML MDRD: 30 ML/MIN/1.73
GLUCOSE BLD-MCNC: 91 MG/DL (ref 65–99)
GLUCOSE BLDC GLUCOMTR-MCNC: 100 MG/DL (ref 70–130)
GLUCOSE BLDC GLUCOMTR-MCNC: 94 MG/DL (ref 70–130)
GLUCOSE UR STRIP-MCNC: ABNORMAL MG/DL
HCT VFR BLD AUTO: 31.4 % (ref 40.4–52.2)
HGB BLD-MCNC: 10.4 G/DL (ref 13.7–17.6)
HGB UR QL STRIP.AUTO: NEGATIVE
HYALINE CASTS UR QL AUTO: NORMAL /LPF
IMM GRANULOCYTES # BLD: 0.02 10*3/MM3 (ref 0–0.03)
IMM GRANULOCYTES NFR BLD: 0.3 % (ref 0–0.5)
INR PPP: 1.03 (ref 0.9–1.1)
KETONES UR QL STRIP: ABNORMAL
LEUKOCYTE ESTERASE UR QL STRIP.AUTO: NEGATIVE
LYMPHOCYTES # BLD AUTO: 1.74 10*3/MM3 (ref 0.9–4.8)
LYMPHOCYTES NFR BLD AUTO: 22.1 % (ref 19.6–45.3)
MCH RBC QN AUTO: 28.8 PG (ref 27–32.7)
MCHC RBC AUTO-ENTMCNC: 33.1 G/DL (ref 32.6–36.4)
MCV RBC AUTO: 87 FL (ref 79.8–96.2)
MONOCYTES # BLD AUTO: 0.71 10*3/MM3 (ref 0.2–1.2)
MONOCYTES NFR BLD AUTO: 9 % (ref 5–12)
NEUTROPHILS # BLD AUTO: 5.13 10*3/MM3 (ref 1.9–8.1)
NEUTROPHILS NFR BLD AUTO: 65 % (ref 42.7–76)
NITRITE UR QL STRIP: NEGATIVE
PH UR STRIP.AUTO: 6 [PH] (ref 5–8)
PHOSPHATE SERPL-MCNC: 2.9 MG/DL (ref 2.5–4.5)
PLATELET # BLD AUTO: 347 10*3/MM3 (ref 140–500)
PMV BLD AUTO: 9.3 FL (ref 6–12)
POTASSIUM BLD-SCNC: 3.1 MMOL/L (ref 3.5–5.2)
PROT UR QL STRIP: ABNORMAL
PROTHROMBIN TIME: 13.1 SECONDS (ref 11.7–14.2)
RBC # BLD AUTO: 3.61 10*6/MM3 (ref 4.6–6)
RBC # UR: NORMAL /HPF
REF LAB TEST METHOD: NORMAL
SODIUM BLD-SCNC: 138 MMOL/L (ref 136–145)
SP GR UR STRIP: 1.01 (ref 1–1.03)
SQUAMOUS #/AREA URNS HPF: NORMAL /HPF
UROBILINOGEN UR QL STRIP: ABNORMAL
WBC NRBC COR # BLD: 7.88 10*3/MM3 (ref 4.5–10.7)
WBC UR QL AUTO: NORMAL /HPF

## 2017-09-20 PROCEDURE — 97162 PT EVAL MOD COMPLEX 30 MIN: CPT | Performed by: PHYSICAL THERAPIST

## 2017-09-20 PROCEDURE — G0378 HOSPITAL OBSERVATION PER HR: HCPCS

## 2017-09-20 PROCEDURE — G8979 MOBILITY GOAL STATUS: HCPCS | Performed by: PHYSICAL THERAPIST

## 2017-09-20 PROCEDURE — 82533 TOTAL CORTISOL: CPT | Performed by: INTERNAL MEDICINE

## 2017-09-20 PROCEDURE — 80069 RENAL FUNCTION PANEL: CPT | Performed by: INTERNAL MEDICINE

## 2017-09-20 PROCEDURE — 96374 THER/PROPH/DIAG INJ IV PUSH: CPT

## 2017-09-20 PROCEDURE — 82962 GLUCOSE BLOOD TEST: CPT

## 2017-09-20 PROCEDURE — 25010000002 HYDRALAZINE PER 20 MG: Performed by: INTERNAL MEDICINE

## 2017-09-20 PROCEDURE — 85025 COMPLETE CBC W/AUTO DIFF WBC: CPT | Performed by: INTERNAL MEDICINE

## 2017-09-20 PROCEDURE — 97110 THERAPEUTIC EXERCISES: CPT | Performed by: PHYSICAL THERAPIST

## 2017-09-20 PROCEDURE — G8978 MOBILITY CURRENT STATUS: HCPCS | Performed by: PHYSICAL THERAPIST

## 2017-09-20 PROCEDURE — 96361 HYDRATE IV INFUSION ADD-ON: CPT

## 2017-09-20 PROCEDURE — 25010000003 POTASSIUM CHLORIDE 10 MEQ/100ML SOLUTION: Performed by: INTERNAL MEDICINE

## 2017-09-20 PROCEDURE — G8980 MOBILITY D/C STATUS: HCPCS | Performed by: PHYSICAL THERAPIST

## 2017-09-20 PROCEDURE — 85610 PROTHROMBIN TIME: CPT | Performed by: INTERNAL MEDICINE

## 2017-09-20 PROCEDURE — 81001 URINALYSIS AUTO W/SCOPE: CPT | Performed by: INTERNAL MEDICINE

## 2017-09-20 PROCEDURE — 87086 URINE CULTURE/COLONY COUNT: CPT | Performed by: INTERNAL MEDICINE

## 2017-09-20 RX ORDER — MIDAZOLAM HYDROCHLORIDE 1 MG/ML
1 INJECTION INTRAMUSCULAR; INTRAVENOUS
Status: DISCONTINUED | OUTPATIENT
Start: 2017-09-20 | End: 2017-09-20 | Stop reason: HOSPADM

## 2017-09-20 RX ORDER — POTASSIUM CHLORIDE 7.45 MG/ML
10 INJECTION INTRAVENOUS
Status: DISCONTINUED | OUTPATIENT
Start: 2017-09-20 | End: 2017-09-20

## 2017-09-20 RX ORDER — POTASSIUM CHLORIDE 750 MG/1
40 CAPSULE, EXTENDED RELEASE ORAL ONCE
Status: COMPLETED | OUTPATIENT
Start: 2017-09-20 | End: 2017-09-20

## 2017-09-20 RX ORDER — MIDAZOLAM HYDROCHLORIDE 1 MG/ML
2 INJECTION INTRAMUSCULAR; INTRAVENOUS
Status: DISCONTINUED | OUTPATIENT
Start: 2017-09-20 | End: 2017-09-20 | Stop reason: HOSPADM

## 2017-09-20 RX ORDER — HYDRALAZINE HYDROCHLORIDE 20 MG/ML
10 INJECTION INTRAMUSCULAR; INTRAVENOUS EVERY 6 HOURS PRN
Status: DISCONTINUED | OUTPATIENT
Start: 2017-09-20 | End: 2017-09-20 | Stop reason: HOSPADM

## 2017-09-20 RX ORDER — SODIUM CHLORIDE, SODIUM LACTATE, POTASSIUM CHLORIDE, CALCIUM CHLORIDE 600; 310; 30; 20 MG/100ML; MG/100ML; MG/100ML; MG/100ML
9 INJECTION, SOLUTION INTRAVENOUS CONTINUOUS
Status: DISCONTINUED | OUTPATIENT
Start: 2017-09-20 | End: 2017-09-20 | Stop reason: HOSPADM

## 2017-09-20 RX ORDER — FAMOTIDINE 10 MG/ML
20 INJECTION, SOLUTION INTRAVENOUS ONCE
Status: DISCONTINUED | OUTPATIENT
Start: 2017-09-20 | End: 2017-09-20 | Stop reason: HOSPADM

## 2017-09-20 RX ORDER — SODIUM CHLORIDE 0.9 % (FLUSH) 0.9 %
1-10 SYRINGE (ML) INJECTION AS NEEDED
Status: DISCONTINUED | OUTPATIENT
Start: 2017-09-20 | End: 2017-09-20 | Stop reason: HOSPADM

## 2017-09-20 RX ORDER — FENTANYL CITRATE 50 UG/ML
50 INJECTION, SOLUTION INTRAMUSCULAR; INTRAVENOUS
Status: DISCONTINUED | OUTPATIENT
Start: 2017-09-20 | End: 2017-09-20 | Stop reason: HOSPADM

## 2017-09-20 RX ADMIN — HYDRALAZINE HYDROCHLORIDE 10 MG: 20 INJECTION INTRAMUSCULAR; INTRAVENOUS at 05:36

## 2017-09-20 RX ADMIN — SODIUM CHLORIDE 75 ML/HR: 9 INJECTION, SOLUTION INTRAVENOUS at 08:31

## 2017-09-20 RX ADMIN — POTASSIUM CHLORIDE 10 MEQ: 10 INJECTION, SOLUTION INTRAVENOUS at 09:09

## 2017-09-20 RX ADMIN — METOPROLOL TARTRATE 50 MG: 50 TABLET, FILM COATED ORAL at 08:29

## 2017-09-20 RX ADMIN — POTASSIUM CHLORIDE 40 MEQ: 750 CAPSULE, EXTENDED RELEASE ORAL at 12:12

## 2017-09-20 NOTE — PLAN OF CARE
Problem: Patient Care Overview (Adult)  Goal: Plan of Care Review    09/20/17 0947   Outcome Evaluation   Outcome Summary/Follow up Plan Pt presents with LLE weakness with some inconsistent presentation, impaired balance, difficulty walking and limited activity tolerance. Pt and wife were present for eval and it was difficult to obtain a clear answer regarding pt concerns or equipment needs. Per Pt, he has worked with PT in the past and they signed off. He has a Rwx at home. Wife originally requested a light weight wheelchair, but later in the eval that was ruled out. Pt and wife would like a motorized scooter for community use. Pt was instructed to followup with his PCP to initiate this procress. However, transporting this scooter even with a lift or trailer added to their car will likely limit its use for the same reasons as the transport chair. Wife is unable to assist with lifting or managing equipment in or out of the car. Regardless, PT will sign off for now at pt's request. He states he is leaving today no matter what. Discussed with CCP and RN.

## 2017-09-20 NOTE — THERAPY DISCHARGE NOTE
Acute Care - Physical Therapy Initial Eval/Discharge  [facility]     Patient Name: Robert Botello  : 1945  MRN: [id number]  Today's Date: 2017                Admit Date: 2017    Visit Dx:    ICD-10-CM ICD-9-CM   1. Weakness R53.1 780.79     Patient Active Problem List   Diagnosis   • CKD (chronic kidney disease) stage 4, GFR 15-29 ml/min   • Nausea & vomiting   • Type 2 diabetes mellitus with complication   • Essential hypertension   • Neurologic gait disorder     Past Medical History:   Diagnosis Date   • Acid reflux    • Anemia    • Atrial fibrillation    • Coronary artery disease    • Diabetes mellitus    • Exposure to TB     TREATED AS CHILD   • Heart attack        • History of transfusion    • Hypertension    • Kidney disease    • Leg weakness     USES WALKER   • Neuropathy      Past Surgical History:   Procedure Laterality Date   • ANKLE SURGERY     • AORTIC VALVE REPAIR/REPLACEMENT     • CORONARY ANGIOPLASTY     • CORONARY STENT PLACEMENT     • HEMORROIDECTOMY     • TIBIA FRACTURE SURGERY            PT ASSESSMENT (last 72 hours)      PT Evaluation       17 0942 17 1612    Rehab Evaluation    Document Type evaluation;discharge summary  -KH     Patient Effort, Rehab Treatment fair  -KH     Symptoms Noted During/After Treatment fatigue  -KH     General Information    General Observations in bed, wife in the room  -KH     Pertinent History Of Current Problem renal failure, h/o L leg weakness  -KH     Precautions/Limitations fall precautions  -KH     Prior Level of Function independent:  -KH     Equipment Currently Used at Home walker, rolling  -KH     Plans/Goals Discussed With patient and family  -KH     Living Environment    Lives With  spouse  -LF    Living Arrangements  condominium  -LF    Home Accessibility stairs to enter home  -KH no concerns  -LF    Number of Stairs to Enter Home 3  -KH     Stair Railings at Home  none  -LF    Type of Financial/Environmental Concern   none  -LF    Transportation Available  none  -LF    Living Environment Comment  none  -LF    Clinical Impression    Patient/Family Goals Statement return home today  -     Criteria for Skilled Therapeutic Interventions Met current level of function same as previous level of function  -     Impairments Found (describe specific impairments) gait, locomotion, and balance;ROM  -     Pain Assessment    Pain Assessment No/denies pain  -     Cognitive Assessment/Intervention    Current Cognitive/Communication Assessment impaired  -     Orientation Status oriented x 4  -     Follows Commands/Answers Questions 100% of the time  -     Personal Safety decreased awareness, need for assist;decreased awareness, need for safety  -     Personal Safety Interventions fall prevention program maintained;gait belt;nonskid shoes/slippers when out of bed  -     ROM (Range of Motion)    General ROM Detail WFl, except LLE  -     MMT (Manual Muscle Testing)    General MMT Assessment Detail Functionally 3/5 throughout except L LE 2/5  -     Bed Mobility, Assessment/Treatment    Bed Mobility, Assistive Device bed rails  -     Bed Mob, Supine to Sit, Hanson conditional independence  -     Bed Mob, Sit to Supine, Hans[place] conditional independence  -     Transfer Assessment/Treatment    Transfers, Sit-Stand Hanson minimum assist (75% patient effort);2 person assist required  -     Transfers, Stand-Sit Hanson contact guard assist  -     Transfers, Sit-Stand-Sit, Assist Device rolling walker  -     Gait Assessment/Treatment    Gait, Hanson Level contact guard assist;2 person assist required  -     Gait, Assistive Device rolling walker  -     Gait, Distance (Feet) 90  -     Gait, Gait Deviations eric decreased;decreased heel strike;forward flexed posture;step length decreased   LLE externally rotated, decreased knee flexion on LLE  -     Gait, Safety Issues step length  decreased;balance decreased during turns  -     Gait, Impairments strength decreased;impaired balance;sensation decreased  -KH     Positioning and Restraints    Pre-Treatment Position in bed  -KH     Post Treatment Position bed  -KH     In Bed supine;encouraged to call for assist;call light within reach;exit alarm on;with family/caregiver;notified nsg  -MATHIEU       09/19/17 0678       General Information    Equipment Currently Used at Home walker, rolling;slide board  -       User Key  (r) = Recorded By, (t) = Taken By, (c) = Cosigned By    Initials Name Provider Type     Vanessa Menjivar, PT Physical Therapist     Lois Dhillon, RN Registered Nurse              PT Recommendation and Plan  Anticipated Discharge Disposition: home with assist  PT Frequency: evaluation only  Plan of Care Review  Outcome Summary/Follow up Plan: Pt presents with LLE weakness with some inconsistent presentation, impaired balance, difficulty walking and  limited activity tolerance. Pt and wife were present for eval and it was difficult to obtain a clear answer regarding pt concerns or equipment needs. Per Pt, he has worked with PT in the past and they signed off. He has a Rwx at home. Wife originally requested a light weight wheelchair, but later in the eval that was ruled out. Pt and wife would like a motorized scooter for community use. Pt was instructed to followup with his PCP to initiate this procress. However, this transporting this scooter even with a lift or trailer added to their car  will likely limits its use for the same reasons as the transport chair. Wife is unable to assist with lifting or managing equipment  in or out of the car. Regardless, PT will sign off for now at pt's request. Discussed with CCP and RN.              Outcome Measures       09/20/17 8875          How much help from another person do you currently need...    Turning from your back to your side while in flat bed without using bedrails? 4   -KH      Moving from lying on back to sitting on the side of a flat bed without bedrails? 4  -KH      Moving to and from a bed to a chair (including a wheelchair)? 3  -KH      Standing up from a chair using your arms (e.g., wheelchair, bedside chair)? 3  -KH      Climbing 3-5 steps with a railing? 2  -KH      To walk in hospital room? 3  -KH      AM-PAC 6 Clicks Score 19  -KH      Functional Assessment    Outcome Measure Options AM-PAC 6 Clicks Basic Mobility (PT)  -KH        User Key  (r) = Recorded By, (t) = Taken By, (c) = Cosigned By    Initials Name Provider Type    MATHIEU Menjivar, PT Physical Therapist           Time Calculation:         PT Charges       09/20/17 0956          Time Calculation    Start Time 0920  -KH      Stop Time 0940  -KH      Time Calculation (min) 20 min  -KH        User Key  (r) = Recorded By, (t) = Taken By, (c) = Cosigned By    Initials Name Provider Type    MATHIEU Menjivar, PT Physical Therapist          Therapy Charges for Today     Code Description Service Date Service Provider Modifiers Qty    34647971788 HC PT EVAL MOD COMPLEXITY 1 9/20/2017 Vanessa Menjivar, PT GP 1    91181369313 HC PT THER PROC EA 15 MIN 9/20/2017 Vanessa Menjivar, PT GP 1    92798792086 HC PT THER SUPP EA 15 MIN 9/20/2017 Vanessa Menjivar, PT GP 1    63006832491 HC PT MOBILITY CURRENT 9/20/2017 Vanessa Menjivar, PT GP, CJ 1    58126745192 HC PT MOBILITY PROJECTED 9/20/2017 Vanessa Menjivar, PT GP, CJ 1    33208137228 HC PT MOBILITY DISCHARGE 9/20/2017 Vanessa Menjivar, PT GP, CJ 1          PT G-Codes  Outcome Measure Options: AM-PAC 6 Clicks Basic Mobility (PT)  Score: 19  Functional Limitation: Mobility: Walking and moving around  Mobility: Walking and Moving Around Current Status (): At least 20 percent but less than 40 percent impaired, limited or restricted  Mobility: Walking and Moving Around Goal Status (): At least 20 percent but less  than 40 percent impaired, limited or restricted  Mobility: Walking and Moving Around Discharge Status (): At least 20 percent but less than 40 percent impaired, limited or restricted    PT Discharge Summary  Anticipated Discharge Disposition: home with assist  Reason for Discharge: Patient/Caregiver request, At baseline function  Discharge Destination: Home with assist    Vanessa Menjivar, PT  9/20/2017

## 2017-09-20 NOTE — H&P (VIEW-ONLY)
Patient Name: Robert Botello Account #: [id number]    MRN: [id number] Admission Date: 9/19/2017      Consulting Service: Vascular Surgery Date of Evaluation: September 19, 2017    Requesting Provider: Dr. Rodo Doran MD    CHIEF COMPLAINT: Known advancing renal failure now with severe left leg pain and nausea and weight loss   HPI: Robert Botello is a 71 y.o. male is being seen for a consultation and evaluation/management of multiple somatic complaints including severe left leg pain, nausea and weight loss and progressive renal failure.  It is unclear to me whether he is progressed to acute renal failure and will need to have a tunnel dialysis catheter placement or whether the elective fistula placement he has on for tomorrow is still the plan.  The patient is completely unaware of the reason for his admission as is his wife.  At this point in time he states his admission is because of his leg pain.  His wife thinks is because he is not eating and has lost 50 pounds per his report.  There is no notation of the charts and I somehow cannot get a hold of any of his records of his prior workup.  He was admitted at the request of [facility].    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   • Heart attack     2012   • History of transfusion    • Hypertension    • Kidney disease    • Leg weakness     USES WALKER   • Neuropathy       PAST SURGICAL HISTORY:   Past Surgical History:   Procedure Laterality Date   • ANKLE SURGERY     • AORTIC VALVE REPAIR/REPLACEMENT     • CORONARY ANGIOPLASTY     • CORONARY STENT PLACEMENT     • HEMORROIDECTOMY     • TIBIA FRACTURE SURGERY        FAMILY HISTORY:   Family History   Problem Relation Age of Onset   • Malig Hyperthermia Neg Hx       SOCIAL HISTORY:   Social History   Substance Use Topics   • Smoking  "status: Former Smoker     Packs/day: 0.50     Years: 34.00     Types: Cigarettes   • Smokeless tobacco: Never Used   • Alcohol use No      MEDICATIONS:   No current facility-administered medications on file prior to encounter.      Current Outpatient Prescriptions on File Prior to Encounter   Medication Sig Dispense Refill   • amLODIPine (NORVASC) 5 MG tablet Take 5 mg by mouth Every Morning.     • FLUoxetine (PROzac) 40 MG capsule Take 40 mg by mouth Every Morning.     • metoprolol tartrate (LOPRESSOR) 50 MG tablet Take 50 mg by mouth 2 (Two) Times a Day.     • Omega-3 Fatty Acids (FISH OIL) 1000 MG capsule capsule Take 1,000 mg by mouth 2 (Two) Times a Day With Meals.     • omeprazole (priLOSEC) 40 MG capsule Take 40 mg by mouth Every Morning.     • aspirin 81 MG EC tablet Take 81 mg by mouth Daily. PT HOLDING FOR SURGERY     • vitamin D (ERGOCALCIFEROL) 33962 units capsule capsule Take 50,000 Units by mouth 2 (Two) Times a Week.     • [DISCONTINUED] HYDROcodone-acetaminophen (NORCO) 5-325 MG per tablet Take 1 tablet by mouth Every 6 (Six) Hours As Needed.     • [DISCONTINUED] Insulin Glargine (LANTUS SC) Inject  under the skin Every Night. 45-50 PER SLIDING SCALE               ALLERGIES: Atorvastatin   COMPLETE REVIEW OF SYSTEMS:     ENT ROS: negative  Cardiovascular ROS: no chest pain or dyspnea on exertion  Respiratory ROS: no cough, shortness of breath, or wheezing  Gastrointestinal ROS: no abdominal pain, change in bowel habits, or black or bloody stools  Neurological ROS: no TIA or stroke symptoms  Genito-Urinary ROS: no dysuria, trouble voiding, or hematuria  Musculoskeletal ROS: negative  Dermatological ROS: negative  Psychological ROS: negative      PHYSICAL EXAM:   Patient Vitals for the past 24 hrs:   BP Temp Temp src Pulse Resp SpO2 Height Weight   09/19/17 1521 132/86 98.3 °F (36.8 °C) Oral 64 14 99 % 72\" (182.9 cm) 208 lb (94.3 kg)        General appearance: alert, well appearing, and in no " distress.  Neurological exam reveals alert, oriented, normal speech, no focal findings or movement disorder noted.  ENT exam reveals - ENT exam normal, no neck nodes or sinus tenderness.  CVS exam: normal rate, regular rhythm, normal S1, S2, no murmurs, rubs, clicks or gallops.  Chest: clear to auscultation, no wheezes, rales or rhonchi, symmetric air entry.  Abdominal exam: soft, nontender, nondistended, no masses or organomegaly.  Examination of the feet reveals warm, good capillary refill.He has easily palpable pulse at all sites with a slight left carotid bruit.  He has tenderness to his left leg diffusely stocking glove with no obvious physical findings other than bilateral lower extremity +2 edema and his left popliteal pulses slightly widened.        LABS:      Results Review:       I reviewed the patient's new clinical results.    Results from last 7 days  Lab Units 09/19/17  1600 09/13/17  1029   WBC 10*3/mm3 9.05 9.45   HEMOGLOBIN g/dL 11.8* 12.1*   PLATELETS 10*3/mm3 370 407     Results from last 7 days  Lab Units 09/19/17  1600 09/13/17  1029   SODIUM mmol/L 141 140   POTASSIUM mmol/L 3.8 3.1*   CHLORIDE mmol/L 102 104   CO2 mmol/L 22.4 23.3   BUN mg/dL 21 22   CREATININE mg/dL 2.65* 2.48*   GLUCOSE mg/dL 118* 70   Estimated Creatinine Clearance: 31.4 mL/min (by C-G formula based on Cr of 2.65).  Results from last 7 days  Lab Units 09/19/17  1600 09/13/17  1029   CALCIUM mg/dL 9.9 9.5   ALBUMIN g/dL 3.60 3.70   MAGNESIUM mg/dL 1.8  --    PHOSPHORUS mg/dL 3.8 3.6       Results from last 7 days  Lab Units 09/19/17  1600 09/13/17  1029   PROTIME Seconds 12.9 12.3   INR  1.01 0.95       The following radiologic or non-invasive studies have been reviewed by me: Chest x-ray showing tortuous aorta    Active Hospital Problems (** Indicates Principal Problem)    Diagnosis Date Noted   • **Nausea & vomiting [R11.2] 09/19/2017   • CKD (chronic kidney disease) stage 4, GFR 15-29 ml/min [N18.4] 09/19/2017   • Type 2  diabetes mellitus with complication [E11.8] 09/19/2017   • Essential hypertension [I10] 09/19/2017   • Atrial fibrillation [I48.91] 09/19/2017      Resolved Hospital Problems    Diagnosis Date Noted Date Resolved   • Acute renal failure [N17.9] 09/19/2017 09/19/2017         ASSESSMENT/PLAN: 71 y.o. male with Known renal insufficiency.  He is on the schedule for surgery tomorrow to have an elective left brachiocephalic AV fistula placement.  At this point in time his admission remains as little bit of a mystery to me as I'm unclear as to what the actual reason for it is.  If his admission is for severe leg pain and workup is needed to determine the source then I still believe the placement of a fistula is warranted as he will likely need dialysis down the road.  If he is admitted for weight loss and nausea and vomiting and concern is hidden malignancy then it may make more sense to delay his procedure until that is ruled out.  Finally it is quite likely that he is being admitted because of progressive uremia with a poor understanding of his own symptoms.  This is the case he may need a tunneled hemodialysis catheter placed instead of or in addition to his left AV fistula placement tomorrow.  We'll need a call back from the renal doctors to determine what the plan is.  We will keep him nothing by mouth after midnight and preoperatively for his fistula placement.  I will add possible line placement to his consent.      I discussed the plan with the patient and he and his wife are  agreeable to the plan of care at this point. Thank you for this consult.     Bharat Mercer MD   09/19/17

## 2017-09-20 NOTE — CONSULTS
Referring Provider: A  Reason for Consultation: N/V, weakness    Subjective     Chief complaint No chief complaint on file.      History of present illness:  71 year old wm with ckd4 due to diabetes, hypertension, cad who is scheduled for fistula creation tomorrow.  He had a routine visit today with Dr. Meza and told my partner he felt weak and tired and hadn't been able to keep much food down.  Dr. Meza had him direct admitted to rule out uremia and urgent need for hemodialysis.  Today he is eating his dinner and says that he does have intermittent nausea.   No metallic taste.  He is very sedentary.      Past Medical History:   Diagnosis Date   • Acid reflux    • Anemia    • Atrial fibrillation    • Coronary artery disease    • Diabetes mellitus    • Exposure to TB     TREATED AS CHILD   • Heart attack     2012   • History of transfusion    • Hypertension    • Kidney disease    • Leg weakness     USES WALKER   • Neuropathy      Past Surgical History:   Procedure Laterality Date   • ANKLE SURGERY     • AORTIC VALVE REPAIR/REPLACEMENT     • CORONARY ANGIOPLASTY     • CORONARY STENT PLACEMENT     • HEMORROIDECTOMY     • TIBIA FRACTURE SURGERY       Family History   Problem Relation Age of Onset   • Malig Hyperthermia Neg Hx        Social History   Substance Use Topics   • Smoking status: Former Smoker     Packs/day: 0.50     Years: 34.00     Types: Cigarettes   • Smokeless tobacco: Never Used   • Alcohol use No     Prescriptions Prior to Admission   Medication Sig Dispense Refill Last Dose   • amLODIPine (NORVASC) 5 MG tablet Take 5 mg by mouth Every Morning.    at Unknown time   • FLUoxetine (PROzac) 40 MG capsule Take 40 mg by mouth Every Morning.      • HYDROcodone-acetaminophen (NORCO)  MG per tablet Take 1 tablet by mouth 4 (Four) Times a Day.   9/19/2017 at 1000   • insulin glargine (LANTUS) 100 UNIT/ML injection Inject 50 Units under the skin Every Night.      • metoprolol tartrate  "(LOPRESSOR) 50 MG tablet Take 50 mg by mouth 2 (Two) Times a Day.      • Omega-3 Fatty Acids (FISH OIL) 1000 MG capsule capsule Take 1,000 mg by mouth 2 (Two) Times a Day With Meals.   8/30/2017   • omeprazole (priLOSEC) 40 MG capsule Take 40 mg by mouth Every Morning.      • aspirin 81 MG EC tablet Take 81 mg by mouth Daily. PT HOLDING FOR SURGERY   8/30/2017   • vitamin D (ERGOCALCIFEROL) 72698 units capsule capsule Take 50,000 Units by mouth 2 (Two) Times a Week.        Allergies:  Atorvastatin    Review of Systems  Pertinent items are noted in HPI.    Objective     Vital Signs  Temp:  [98.3 °F (36.8 °C)-98.7 °F (37.1 °C)] 98.7 °F (37.1 °C)  Heart Rate:  [64-71] 71  Resp:  [14-16] 16  BP: (132-187)/() 187/103    Flowsheet Rows         First Filed Value    Admission Height  72\" (182.9 cm) Documented at 09/19/2017 1521    Admission Weight  208 lb (94.3 kg) Documented at 09/19/2017 1521                 No intake or output data in the 24 hours ending 09/19/17 2012    Physical Exam:     General Appearance:    Alert, cooperative, in no acute distress   Head:    Normocephalic, without obvious abnormality, atraumatic   Eyes:            Lids and lashes normal, conjunctivae and sclerae normal, no   icterus, no pallor, corneas clear, PERRLA   Ears:    Ears appear intact with no abnormalities noted   Throat:   No oral lesions, no thrush, oral mucosa moist   Neck:   No adenopathy, supple, trachea midline, no thyromegaly, no     carotid bruit, no JVD   Back:     No kyphosis present, no scoliosis present, no skin lesions,       erythema or scars, no tenderness to percussion or                   palpation,   range of motion normal   Lungs:     Clear to auscultation,respirations regular, even and                   unlabored    Heart:    Regular rhythm and normal rate, normal S1 and S2, no            murmur, no gallop, no rub, no click   Breast Exam:    Deferred   Abdomen:     Normal bowel sounds, no masses, no organomegaly, " soft        non-tender, non-distended, no guarding, no rebound                 tenderness   Genitalia:    Deferred   Extremities:   Moves all extremities well, no edema, no cyanosis, no              redness   Pulses:   Pulses palpable and equal bilaterally   Skin:   No bleeding, bruising or rash   Lymph nodes:   No palpable adenopathy   Neurologic:   Cranial nerves 2 - 12 grossly intact, sensation intact, DTR        present and equal bilaterally         Results Review:Results for orders placed or performed during the hospital encounter of 09/19/17   CBC Auto Differential   Result Value Ref Range    WBC 9.05 4.50 - 10.70 10*3/mm3    RBC 4.02 (L) 4.60 - 6.00 10*6/mm3    Hemoglobin 11.8 (L) 13.7 - 17.6 g/dL    Hematocrit 35.1 (L) 40.4 - 52.2 %    MCV 87.3 79.8 - 96.2 fL    MCH 29.4 27.0 - 32.7 pg    MCHC 33.6 32.6 - 36.4 g/dL    RDW 13.3 11.5 - 14.5 %    RDW-SD 42.6 37.0 - 54.0 fl    MPV 9.2 6.0 - 12.0 fL    Platelets 370 140 - 500 10*3/mm3    Neutrophil % 78.5 (H) 42.7 - 76.0 %    Lymphocyte % 12.2 (L) 19.6 - 45.3 %    Monocyte % 7.4 5.0 - 12.0 %    Eosinophil % 1.4 0.3 - 6.2 %    Basophil % 0.2 0.0 - 1.5 %    Immature Grans % 0.3 0.0 - 0.5 %    Neutrophils, Absolute 7.10 1.90 - 8.10 10*3/mm3    Lymphocytes, Absolute 1.10 0.90 - 4.80 10*3/mm3    Monocytes, Absolute 0.67 0.20 - 1.20 10*3/mm3    Eosinophils, Absolute 0.13 0.00 - 0.70 10*3/mm3    Basophils, Absolute 0.02 0.00 - 0.20 10*3/mm3    Immature Grans, Absolute 0.03 0.00 - 0.03 10*3/mm3   Comprehensive Metabolic Panel   Result Value Ref Range    Glucose 118 (H) 65 - 99 mg/dL    BUN 21 8 - 23 mg/dL    Creatinine 2.65 (H) 0.76 - 1.27 mg/dL    Sodium 141 136 - 145 mmol/L    Potassium 3.8 3.5 - 5.2 mmol/L    Chloride 102 98 - 107 mmol/L    CO2 22.4 22.0 - 29.0 mmol/L    Calcium 9.9 8.6 - 10.5 mg/dL    Total Protein 6.9 6.0 - 8.5 g/dL    Albumin 3.60 3.50 - 5.20 g/dL    ALT (SGPT) 8 1 - 41 U/L    AST (SGOT) 11 1 - 40 U/L    Alkaline Phosphatase 86 39 - 117 U/L     Total Bilirubin 0.4 0.1 - 1.2 mg/dL    eGFR Non African Amer 24 (L) >60 mL/min/1.73    Globulin 3.3 gm/dL    A/G Ratio 1.1 g/dL    BUN/Creatinine Ratio 7.9 7.0 - 25.0    Anion Gap 16.6 mmol/L   Hemoglobin A1c   Result Value Ref Range    Hemoglobin A1C 6.29 (H) 4.80 - 5.60 %   Magnesium   Result Value Ref Range    Magnesium 1.8 1.6 - 2.4 mg/dL   Phosphorus   Result Value Ref Range    Phosphorus 3.8 2.5 - 4.5 mg/dL   Protime-INR   Result Value Ref Range    Protime 12.9 11.7 - 14.2 Seconds    INR 1.01 0.90 - 1.10   TSH   Result Value Ref Range    TSH 0.860 0.270 - 4.200 mIU/mL   POC Glucose Fingerstick   Result Value Ref Range    Glucose 111 70 - 130 mg/dL     Imaging Results (last 72 hours)     ** No results found for the last 72 hours. **              [START ON 9/20/2017] FLUoxetine 40 mg Oral QAM   insulin aspart 0-9 Units Subcutaneous 4x Daily With Meals & Nightly   insulin detemir 45 Units Subcutaneous Nightly   metoprolol tartrate 50 mg Oral BID   [START ON 9/20/2017] pantoprazole 40 mg Oral QAM       sodium chloride 75 mL/hr Last Rate: 75 mL/hr (09/19/17 1825)       Assessment/Plan     Principal Problem:    Nausea & vomiting  Active Problems:    CKD (chronic kidney disease) stage 4, GFR 15-29 ml/min    Type 2 diabetes mellitus with complication    Essential hypertension    Neurologic gait disorder    CKD4:  He has stable electrolytes and kidney function.  No volume excess.  There is  No indication for urgent start of hemodialysis.  He is stable to undergo fistula creation as he is at high risk for development of ESRD.  N/V:  I wonder if this is acid reflux or gastroparesis.  Weakness:  He has had a recent neurology evaluation without major findings.  I agree with PT consult  longstanding DM  CAD unchanged EKG, asymptomatic    I discussed the patients findings and my recommendations with patient and Dr. Xavi Burr MD  09/19/17  8:12 PM

## 2017-09-20 NOTE — INTERVAL H&P NOTE
H&P reviewed. The patient was examined and there are no changes to the H&P.     Informed consent was obtained from the patient     I have discussed with the patient or person providing consent the following five points:  ·  I have been asked to see Robert Botello for the treatment of the diagnosis of: Chronic kidney disease  · The planned treatment of this diagnosis is: Arteriovenous fistula placement  · The risks of a procedure include but are not limited to the following: bleeding, thrombosis, damage to adjacent anatomical structures including nerves or blood vessels, infections, wound healing problems, the need for further procedures, whether planned or emergent. The benefits of a procedure include but are not limited to the following: Allowing maturation time hopefully prior to initiation of hemodialysis and being able to start with autologous access instead of a catheter  · Alternatives to the planned procedure include: Not doing dialysis, catheter-based dialysis, peritoneal dialysis,  · The natural history of the diagnosis if no treatment is given is: Progressive kidney dysfunction and if not getting dialysis can lead to eventual death     The above was explained to using informal language. The the patient was able to be communicate back to me the risks,benefits, alternatives, and all of their questions were asked and then answered.

## 2017-09-20 NOTE — PERIOPERATIVE NURSING NOTE
Case cancelled per Dr. Hurley due to blood draws from left arm.  Patient agrees to reschedule and cancel surgery today.  Report called to DANNA Carmona on [facility]

## 2017-09-20 NOTE — DISCHARGE SUMMARY
Date of Admission: 9/19/2017  Date of Discharge:  9/20/2017    PCP: No Known Provider      DISCHARGE DIAGNOSIS  Principal Problem:    Nausea & vomiting  Active Problems:    CKD (chronic kidney disease) stage 4, GFR 15-29 ml/min    Type 2 diabetes mellitus with complication    Essential hypertension    Neurologic gait disorder      SECONDARY DIAGNOSES  Past Medical History:   Diagnosis Date   • Acid reflux    • Anemia    • Atrial fibrillation    • Coronary artery disease    • Diabetes mellitus    • Exposure to TB     TREATED AS CHILD   • Heart attack     2012   • History of transfusion    • Hypertension    • Kidney disease    • Leg weakness     USES WALKER   • Neuropathy        CONSULTS   Consults     Date and Time Order Name Status Description    9/19/2017 1520 Inpatient Consult to Vascular Surgery Completed     9/19/2017 1520 Inpatient Consult to Nephrology            PROCEDURES PERFORMED  CT Abd/Pelvis:  1.  Mild enteritis is suspected, no obstruction, perforation or abscess.  2.  1.8 cm left, 0.7 cm right adrenal adenomas.  3.  Bilateral renal lesions, some of which are cysts, others are suspected to be hemorrhagic cysts. Confirmation with ultrasound is recommended on nonacute bases, to exclude neoplasm.    HOSPITAL COURSE  Patient is a 71 y.o. male presented to [facility] complaining of N/V.  Please see the admitting history and physical for further details. He was supposed to have AV fistula placed the day after admission and was admitted for hydration. His symptoms resolved afetr 24 hours. His CT showed only a mild enteritis. He was taken down for AV fistula placement; however, he had blood draws from that arm earlier in the am. He had ultrasound done in pre-op area that showed superficial thrombophlebitis and the procedure was postponed and will be rescheduled for a couple of weeks. He was ready to go home today.       CONDITION ON DISCHARGE  Stable.      VITAL SIGNS  /91 (BP  "Location: Right arm, Patient Position: Lying)  Pulse 63  Temp 98.4 °F (36.9 °C) (Oral)   Resp 20  Ht 72\" (182.9 cm)  Wt 208 lb (94.3 kg)  SpO2 96%  BMI 28.21 kg/m2  Objective:  General Appearance:  Comfortable and well-appearing.    Vital signs: (most recent): Blood pressure 173/91, pulse 63, temperature 98.4 °F (36.9 °C), temperature source Oral, resp. rate 20, height 72\" (182.9 cm), weight 208 lb (94.3 kg), SpO2 96 %.  Vital signs are normal.    HEENT: Normal HEENT exam.    Lungs:  Normal effort.  Breath sounds clear to auscultation.    Heart: Normal rate.  Regular rhythm.    Abdomen: Abdomen is soft and non-distended.  Bowel sounds are normal.   There is no abdominal tenderness.     Extremities: Normal range of motion.  There is no dependent edema.    Neurological: Patient is alert and oriented to person, place and time.    Skin:  Warm and dry.  No rash.               DISCHARGE DISPOSITION   Home or Self Care      DISCHARGE MEDICATIONS   Robert Botello   Home Medication Instructions KRISTY:[id number]    Printed on:09/20/17 2790   Medication Information                      amLODIPine (NORVASC) 5 MG tablet  Take 5 mg by mouth Every Morning.             aspirin 81 MG EC tablet  Take 81 mg by mouth Daily. PT HOLDING FOR SURGERY             FLUoxetine (PROzac) 40 MG capsule  Take 40 mg by mouth Every Morning.             HYDROcodone-acetaminophen (NORCO)  MG per tablet  Take 1 tablet by mouth 4 (Four) Times a Day.             insulin glargine (LANTUS) 100 UNIT/ML injection  Inject 50 Units under the skin Every Night.             metoprolol tartrate (LOPRESSOR) 50 MG tablet  Take 50 mg by mouth 2 (Two) Times a Day.             Omega-3 Fatty Acids (FISH OIL) 1000 MG capsule capsule  Take 1,000 mg by mouth 2 (Two) Times a Day With Meals.             omeprazole (priLOSEC) 40 MG capsule  Take 40 mg by mouth Every Morning.             vitamin D (ERGOCALCIFEROL) 57230 units capsule capsule  Take 50,000 Units by " mouth 2 (Two) Times a Week.                No future appointments.    TEST  RESULTS PENDING AT DISCHARGE   Order Current Status    Urine Culture In process             Roel Becerril MD  [facility]  09/20/17  11:45 AM      Time: greater than 30 minutes.      Dragon disclaimer:  Much of this encounter note is an electronic transcription/translation of spoken language to printed text. The electronic translation of spoken language may permit erroneous, or at times, nonsensical words or phrases to be inadvertently transcribed; Although I have reviewed the note for such errors, some may still exist.

## 2017-09-20 NOTE — PLAN OF CARE
Problem: Patient Care Overview (Adult)  Goal: Plan of Care Review  Outcome: Ongoing (interventions implemented as appropriate)    09/20/17 0586   Coping/Psychosocial Response Interventions   Plan Of Care Reviewed With patient;spouse   Patient Care Overview   Progress no change   Outcome Evaluation   Outcome Summary/Follow up Plan Hypertensive, Norvasc 5mg po reordered without success, Hydralazine 10mg IVP given for continued HTN. Awaiting to recheck B/P. IV initiated in RUE d/t probable fistula placement in E today.        Goal: Adult Individualization and Mutuality  Outcome: Ongoing (interventions implemented as appropriate)  Goal: Discharge Needs Assessment  Outcome: Ongoing (interventions implemented as appropriate)    Problem: Renal Failure/Kidney Injury, Acute (Adult)  Goal: Signs and Symptoms of Listed Potential Problems Will be Absent or Manageable (Renal Failure/Kidney Injury, Acute)  Outcome: Ongoing (interventions implemented as appropriate)    Problem: Fall Risk (Adult)  Goal: Identify Related Risk Factors and Signs and Symptoms  Outcome: Ongoing (interventions implemented as appropriate)  Goal: Absence of Falls  Outcome: Ongoing (interventions implemented as appropriate)

## 2017-09-20 NOTE — PROGRESS NOTES
Discussed with Dr. Nirali Og last night.  Patient's BUN and creatinine are not markedly different than before.  GFR in the 20s.  I plan to go ahead and do the left arm brachiocephalic arteriovenous fistula today.    Patient was seen in the holding area preoperatively.  He had an IV in the left hand with bruising of the left forearm.  Additionally, he has a phlebotomy puncture site in the left antecubital crease at the vein site I was planning on working on.  Patient was a bit upset about his blood draws and problems.    I used ultrasound at the point of care in order to evaluate the left cephalic vein.  There was some stranding at the antecubital fossa with a small amount of superficial thrombophlebitis.  This was not occlusive.    I talked to the patient about proceeding with the surgery.  I think it would be reasonable to consider it might have a slightly higher risk of complication or failure to mature.  The other option would be to wait a couple weeks for this to resolve and then reschedule surgery as an outpatient.  He strongly prefers the latter.  For this reason, I canceled the surgery.  My office will contact him in order to schedule surgery in a couple of weeks.

## 2017-09-21 LAB — BACTERIA SPEC AEROBE CULT: NO GROWTH

## 2018-12-10 ENCOUNTER — INPATIENT HOSPITAL (OUTPATIENT)
Dept: URBAN - METROPOLITAN AREA HOSPITAL 107 | Facility: HOSPITAL | Age: 73
End: 2018-12-10

## 2018-12-10 DIAGNOSIS — R19.5 OTHER FECAL ABNORMALITIES: ICD-10-CM

## 2018-12-10 DIAGNOSIS — D50.0 IRON DEFICIENCY ANEMIA SECONDARY TO BLOOD LOSS (CHRONIC): ICD-10-CM

## 2018-12-10 PROCEDURE — 99223 1ST HOSP IP/OBS HIGH 75: CPT | Performed by: INTERNAL MEDICINE

## 2018-12-12 ENCOUNTER — INPATIENT HOSPITAL (OUTPATIENT)
Dept: URBAN - METROPOLITAN AREA HOSPITAL 107 | Facility: HOSPITAL | Age: 73
End: 2018-12-12

## 2018-12-12 DIAGNOSIS — K92.1 MELENA: ICD-10-CM

## 2018-12-12 DIAGNOSIS — D50.0 IRON DEFICIENCY ANEMIA SECONDARY TO BLOOD LOSS (CHRONIC): ICD-10-CM

## 2018-12-12 PROCEDURE — 43235 EGD DIAGNOSTIC BRUSH WASH: CPT | Performed by: INTERNAL MEDICINE

## 2019-03-21 ENCOUNTER — INPATIENT HOSPITAL (OUTPATIENT)
Dept: URBAN - METROPOLITAN AREA HOSPITAL 107 | Facility: HOSPITAL | Age: 74
End: 2019-03-21

## 2019-03-21 DIAGNOSIS — K92.1 MELENA: ICD-10-CM

## 2019-03-21 DIAGNOSIS — D64.9 ANEMIA, UNSPECIFIED: ICD-10-CM

## 2019-03-21 PROCEDURE — 99221 1ST HOSP IP/OBS SF/LOW 40: CPT

## (undated) DEVICE — GOWN,NON-REINFORCED,SIRUS,SET IN SLV,XL: Brand: MEDLINE

## (undated) DEVICE — GLV SURG BIOGEL LTX PF 7

## (undated) DEVICE — ANTIBACTERIAL UNDYED BRAIDED (POLYGLACTIN 910), SYNTHETIC ABSORBABLE SUTURE: Brand: COATED VICRYL

## (undated) DEVICE — BANDAGE,GAUZE,BULKEE II,4.5"X4.1YD,STRL: Brand: MEDLINE

## (undated) DEVICE — SOL NS 500ML

## (undated) DEVICE — SUT PROLN 6/0 BV1 D/A 30IN 8709H

## (undated) DEVICE — SUT SILK 2/0 TIES 18IN A185H

## (undated) DEVICE — SUT SILK 3/0 SH CR5 18IN C0135

## (undated) DEVICE — PK AV FISTL/SHNT 40

## (undated) DEVICE — SUT SILK 3/0 TIES 18IN A184H